# Patient Record
Sex: FEMALE | Race: WHITE
[De-identification: names, ages, dates, MRNs, and addresses within clinical notes are randomized per-mention and may not be internally consistent; named-entity substitution may affect disease eponyms.]

---

## 2017-08-30 ENCOUNTER — HOSPITAL ENCOUNTER (EMERGENCY)
Dept: HOSPITAL 5 - ED | Age: 32
Discharge: HOME | End: 2017-08-30
Payer: MEDICAID

## 2017-08-30 VITALS — DIASTOLIC BLOOD PRESSURE: 73 MMHG | SYSTOLIC BLOOD PRESSURE: 118 MMHG

## 2017-08-30 DIAGNOSIS — N30.01: Primary | ICD-10-CM

## 2017-08-30 DIAGNOSIS — F17.200: ICD-10-CM

## 2017-08-30 LAB
ALBUMIN SERPL-MCNC: 3.2 G/DL (ref 3.9–5)
ALBUMIN/GLOB SERPL: 1 %
ALP SERPL-CCNC: 49 UNITS/L (ref 35–129)
ALT SERPL-CCNC: 8 UNITS/L (ref 7–56)
ANION GAP SERPL CALC-SCNC: 16 MMOL/L
BACTERIA #/AREA URNS HPF: (no result) /HPF
BASOPHILS NFR BLD AUTO: 0.4 % (ref 0–1.8)
BILIRUB SERPL-MCNC: 0.2 MG/DL (ref 0.1–1.2)
BILIRUB UR QL STRIP: (no result)
BLOOD UR QL VISUAL: (no result)
BUN SERPL-MCNC: 12 MG/DL (ref 7–17)
BUN/CREAT SERPL: 24 %
CALCIUM SERPL-MCNC: 8.2 MG/DL (ref 8.4–10.2)
CHLORIDE SERPL-SCNC: 106.5 MMOL/L (ref 98–107)
CO2 SERPL-SCNC: 20 MMOL/L (ref 22–30)
EOSINOPHIL NFR BLD AUTO: 1.8 % (ref 0–4.3)
GLUCOSE SERPL-MCNC: 84 MG/DL (ref 65–100)
HCT VFR BLD CALC: 45.8 % (ref 30.3–42.9)
HGB BLD-MCNC: 14.5 GM/DL (ref 10.1–14.3)
KETONES UR STRIP-MCNC: (no result) MG/DL
LEUKOCYTE ESTERASE UR QL STRIP: (no result)
LIPASE SERPL-CCNC: 39 UNITS/L (ref 13–60)
MCH RBC QN AUTO: 28 PG (ref 28–32)
MCHC RBC AUTO-ENTMCNC: 32 % (ref 30–34)
MCV RBC AUTO: 89 FL (ref 79–97)
MUCOUS THREADS #/AREA URNS HPF: (no result) /HPF
NITRITE UR QL STRIP: (no result)
PH UR STRIP: 5 [PH] (ref 5–7)
PLATELET # BLD: 193 K/MM3 (ref 140–440)
POTASSIUM SERPL-SCNC: 4.9 MMOL/L (ref 3.6–5)
PROT SERPL-MCNC: 6.3 G/DL (ref 6.3–8.2)
RBC # BLD AUTO: 5.15 M/MM3 (ref 3.65–5.03)
RBC #/AREA URNS HPF: 22 /HPF (ref 0–6)
SODIUM SERPL-SCNC: 138 MMOL/L (ref 137–145)
UROBILINOGEN UR-MCNC: < 2 MG/DL (ref ?–2)
WBC # BLD AUTO: 8.3 K/MM3 (ref 4.5–11)
WBC #/AREA URNS HPF: 131 /HPF (ref 0–6)

## 2017-08-30 PROCEDURE — 80053 COMPREHEN METABOLIC PANEL: CPT

## 2017-08-30 PROCEDURE — 85025 COMPLETE CBC W/AUTO DIFF WBC: CPT

## 2017-08-30 PROCEDURE — 96372 THER/PROPH/DIAG INJ SC/IM: CPT

## 2017-08-30 PROCEDURE — 87086 URINE CULTURE/COLONY COUNT: CPT

## 2017-08-30 PROCEDURE — 83690 ASSAY OF LIPASE: CPT

## 2017-08-30 PROCEDURE — 84703 CHORIONIC GONADOTROPIN ASSAY: CPT

## 2017-08-30 PROCEDURE — 81001 URINALYSIS AUTO W/SCOPE: CPT

## 2017-08-30 PROCEDURE — 36415 COLL VENOUS BLD VENIPUNCTURE: CPT

## 2017-08-30 PROCEDURE — 99283 EMERGENCY DEPT VISIT LOW MDM: CPT

## 2017-08-30 NOTE — EMERGENCY DEPARTMENT REPORT
Entered by AURELIA PALOMO, acting as scribe for JEREMY CLIFFORD PA.





ED Female  HPI





- General


Chief complaint: Urogenital-Female


Stated complaint: POSS BLADDER INFECTION


Time Seen by Provider: 17 10:50


Source: patient


Mode of arrival: Ambulatory


Limitations: No Limitations





- History of Present Illness


Initial comments: 





30 y/o female with a PMHx of asthma and obesity presents to the ED c/o pelvic 

pain that began 1 day ago. pain on/oo.Rates pain a 6/10 in severity, which she 

describes as cramping in quality. Aggravated with urination and alleviated with 

nothing. No pain at present. Associated symptom includes dysuria, but she 

denies vaginal discharge, vaginal bleeding, hematuria, urinary urgency and 

frequency, fever, chills, nausea, vomiting, and diarrhea. Denies taking 

anything for her symptoms. LMP 08/10/2017. NKDA. No medication taken. Not 

concern for STD.





MD Complaint: dysuria, pelvic pain


Onset/Timin


-: days(s)


Location: other (pelvic area)


Radiation: non-radiating


Severity: moderate


Severity scale (0 -10): 6


Quality: cramping


Consistency: constant


Improves with: none


Worsens with: urination


Are you Pregnant Now?: No


Last Menstrual Period: 08/10/17


EDC: 18


Associated Symptoms: denies other symptoms, abdominal pain (pelvic pain), 

dysuria.  denies: vaginal discharge, vaginal bleeding, nausea/vomiting, fever/

chills, headaches, loss of appetite, hematuria, rash, seizure, shortness of 

breath, syncope, weakness





- Related Data


Sexually active: Yes (1 partner for years)


 Previous Rx's











 Medication  Instructions  Recorded  Last Taken  Type


 


HYDROcodone/APAP 5-325 [[place] 1 each PO Q6HR PRN #16 tablet 06/15/15 Unknown Rx





5/325]    


 


Sulfamethoxazole/Trimethoprim 1 each PO Q12H #20 tablet 06/15/15 Unknown Rx





[Bactrim Ds]    


 


Nitrofurantoin Mono/M-Cryst 100 mg PO Q12HR #14 capsule 17 Unknown Rx





[Macrobid CAP]    


 


Phenazopyridine [Pyridium] 100 mg PO TID PRN #9 tab 17 Unknown Rx











 Allergies











Allergy/AdvReac Type Severity Reaction Status Date / Time


 


No Known Allergies Allergy   Unverified 14 09:12














ED Review of Systems


Comment: All other systems reviewed and negative


Constitutional: denies: chills, fever


Eyes: denies: eye pain, eye discharge, vision change


ENT: denies: ear pain, throat pain


Respiratory: denies: cough, orthopnea, shortness of breath, SOB with exertion, 

SOB at rest, stridor, wheezing


Cardiovascular: denies: chest pain, palpitations, dyspnea on exertion, orthopnea

, edema, syncope, paroxysmal nocturnal dyspnea


Endocrine: no symptoms reported


Gastrointestinal: abdominal pain (pelvic pain).  denies: nausea, vomiting, 

diarrhea, constipation, hematemesis, melena, hematochezia


Genitourinary: dysuria.  denies: urgency, frequency, hematuria, discharge, 

abnormal menses, dyspareunia


Musculoskeletal: denies: back pain, joint swelling, arthralgia


Skin: denies: rash, lesions


Neurological: denies: headache, weakness, numbness, paresthesias, confusion, 

abnormal gait, vertigo


Psychiatric: denies: anxiety, depression


Hematological/Lymphatic: denies: easy bleeding, easy bruising





ED Past Medical Hx





- Past Medical History


Previous Medical History?: Yes


Hx Hypertension: No


Hx Congestive Heart Failure: No


Hx Diabetes: No


Hx Deep Vein Thrombosis: No


Hx Renal Disease: No


Hx Sickle Cell Disease: No


Hx Seizures: No


Hx Asthma: Yes (CHILD)


Hx COPD: No


Hx HIV: No


Additional medical history: Obesity





- Surgical History


Past Surgical History?: No





- Family History


Family history: no significant





- Social History


Smoking Status: Current Every Day Smoker


Substance Use Type: Alcohol





- Medications


Home Medications: 


 Home Medications











 Medication  Instructions  Recorded  Confirmed  Last Taken  Type


 


HYDROcodone/APAP 5-325 [[place] 1 each PO Q6HR PRN #16 tablet 06/15/15  Unknown Rx





5/325]     


 


Sulfamethoxazole/Trimethoprim 1 each PO Q12H #20 tablet 06/15/15  Unknown Rx





[Bactrim Ds]     


 


Nitrofurantoin Mono/M-Cryst 100 mg PO Q12HR #14 capsule 17  Unknown Rx





[Macrobid CAP]     


 


Phenazopyridine [Pyridium] 100 mg PO TID PRN #9 tab 17  Unknown Rx














ED Physical Exam





- General


Limitations: No Limitations


General appearance: alert, in no apparent distress





- Head


Head exam: Present: atraumatic, normocephalic, normal inspection





- Eye


Eye exam: Present: normal appearance, PERRL, EOMI


Pupils: Present: normal accommodation





- ENT


ENT exam: Present: normal exam, normal orophraynx, mucous membranes moist, TM's 

normal bilaterally, normal external ear exam





- Neck


Neck exam: Present: normal inspection, full ROM.  Absent: tenderness, 

meningismus, lymphadenopathy





- Respiratory


Respiratory exam: Present: normal lung sounds bilaterally.  Absent: respiratory 

distress, wheezes, rales, rhonchi, stridor, chest wall tenderness, accessory 

muscle use, decreased breath sounds





- Cardiovascular


Cardiovascular Exam: Present: regular rate, normal rhythm, normal heart sounds.

  Absent: systolic murmur, diastolic murmur, S3, S4





- GI/Abdominal


GI/Abdominal exam: Present: soft, normal bowel sounds.  Absent: distended, 

tenderness, guarding, rebound, rigid, mass, bruit, pulsatile mass, hernia





- Extremities Exam


Extremities exam: Present: normal inspection, full ROM, normal capillary 

refill.  Absent: tenderness, pedal edema, joint swelling, calf tenderness





- Back Exam


Back exam: Present: normal inspection, full ROM.  Absent: tenderness, CVA 

tenderness (R), CVA tenderness (L), muscle spasm, paraspinal tenderness, 

vertebral tenderness, rash noted





- Neurological Exam


Neurological exam: Present: alert, oriented X3, normal gait, reflexes normal.  

Absent: motor sensory deficit





- Psychiatric


Psychiatric exam: Present: normal affect, normal mood





- Skin


Skin exam: Present: warm, dry, intact, normal color.  Absent: rash





ED Course


 Vital Signs











  17





  08:10


 


Temperature 98.4 F


 


Pulse Rate 92 H


 


Respiratory 20





Rate 


 


Blood Pressure 118/73


 


O2 Sat by Pulse 100





Oximetry 














- Reevaluation(s)


Reevaluation #1: 





17 13:39


 Rocephin 1 g in emergency room to cover urinary tract infection.  Urine 

culture sent





ED Medical Decision Making





- Lab Data


Result diagrams: 


 17 08:28





 17 08:28








 Lab Results











  17 Range/Units





  08:28 08:28 08:28 


 


WBC   8.3   (4.5-11.0)  K/mm3


 


RBC   5.15 H   (3.65-5.03)  M/mm3


 


Hgb   14.5 H   (10.1-14.3)  gm/dl


 


Hct   45.8 H   (30.3-42.9)  %


 


MCV   89   (79-97)  fl


 


MCH   28   (28-32)  pg


 


MCHC   32   (30-34)  %


 


RDW   16.1 H   (13.2-15.2)  %


 


Plt Count   193   (140-440)  K/mm3


 


Lymph % (Auto)   31.8   (13.4-35.0)  %


 


Mono % (Auto)   7.9 H   (0.0-7.3)  %


 


Eos % (Auto)   1.8   (0.0-4.3)  %


 


Baso % (Auto)   0.4   (0.0-1.8)  %


 


Lymph #   2.7   (1.2-5.4)  K/mm3


 


Mono #   0.7   (0.0-0.8)  K/mm3


 


Eos #   0.2   (0.0-0.4)  K/mm3


 


Baso #   0.0   (0.0-0.1)  K/mm3


 


Seg Neutrophils %   58.1   (40.0-70.0)  %


 


Seg Neutrophils #   4.9   (1.8-7.7)  K/mm3


 


Sodium    138  (137-145)  mmol/L


 


Potassium    4.9  (3.6-5.0)  mmol/L


 


Chloride    106.5  ()  mmol/L


 


Carbon Dioxide    20 L  (22-30)  mmol/L


 


Anion Gap    16  mmol/L


 


BUN    12  (7-17)  mg/dL


 


Creatinine    0.5 L  (0.7-1.2)  mg/dL


 


Estimated GFR    > 60  ml/min


 


BUN/Creatinine Ratio    24.00  %


 


Glucose    84  ()  mg/dL


 


Calcium    8.2 L  (8.4-10.2)  mg/dL


 


Total Bilirubin    0.20  (0.1-1.2)  mg/dL


 


AST    17  (5-40)  units/L


 


ALT    8  (7-56)  units/L


 


Alkaline Phosphatase    49  ()  units/L


 


Total Protein    6.3  (6.3-8.2)  g/dL


 


Albumin    3.2 L  (3.9-5)  g/dL


 


Albumin/Globulin Ratio    1.0  %


 


Lipase    39  (13-60)  units/L


 


HCG, Qual     (Negative)  


 


Urine Color  Yellow    (Yellow)  


 


Urine Turbidity  Slightly-cloudy    (Clear)  


 


Urine pH  5.0    (5.0-7.0)  


 


Ur Specific Gravity  1.021    (1.003-1.030)  


 


Urine Protein  100 mg/dl    (Negative)  mg/dL


 


Urine Glucose (UA)  Neg    (Negative)  mg/dL


 


Urine Ketones  Neg    (Negative)  mg/dL


 


Urine Blood  Mod    (Negative)  


 


Urine Nitrite  Neg    (Negative)  


 


Urine Bilirubin  Neg    (Negative)  


 


Urine Urobilinogen  < 2.0    (<2.0)  mg/dL


 


Ur Leukocyte Esterase  Mod    (Negative)  


 


Urine WBC (Auto)  131.0 H    (0.0-6.0)  /HPF


 


Urine RBC (Auto)  22.0    (0.0-6.0)  /HPF


 


U Epithel Cells (Auto)  5.0    (0-13.0)  /HPF


 


Urine Bacteria (Auto)  1+    (Negative)  /HPF


 


Urine Mucus  2+    /HPF














  17 Range/Units





  08:28 


 


WBC   (4.5-11.0)  K/mm3


 


RBC   (3.65-5.03)  M/mm3


 


Hgb   (10.1-14.3)  gm/dl


 


Hct   (30.3-42.9)  %


 


MCV   (79-97)  fl


 


MCH   (28-32)  pg


 


MCHC   (30-34)  %


 


RDW   (13.2-15.2)  %


 


Plt Count   (140-440)  K/mm3


 


Lymph % (Auto)   (13.4-35.0)  %


 


Mono % (Auto)   (0.0-7.3)  %


 


Eos % (Auto)   (0.0-4.3)  %


 


Baso % (Auto)   (0.0-1.8)  %


 


Lymph #   (1.2-5.4)  K/mm3


 


Mono #   (0.0-0.8)  K/mm3


 


Eos #   (0.0-0.4)  K/mm3


 


Baso #   (0.0-0.1)  K/mm3


 


Seg Neutrophils %   (40.0-70.0)  %


 


Seg Neutrophils #   (1.8-7.7)  K/mm3


 


Sodium   (137-145)  mmol/L


 


Potassium   (3.6-5.0)  mmol/L


 


Chloride   ()  mmol/L


 


Carbon Dioxide   (22-30)  mmol/L


 


Anion Gap   mmol/L


 


BUN   (7-17)  mg/dL


 


Creatinine   (0.7-1.2)  mg/dL


 


Estimated GFR   ml/min


 


BUN/Creatinine Ratio   %


 


Glucose   ()  mg/dL


 


Calcium   (8.4-10.2)  mg/dL


 


Total Bilirubin   (0.1-1.2)  mg/dL


 


AST   (5-40)  units/L


 


ALT   (7-56)  units/L


 


Alkaline Phosphatase   ()  units/L


 


Total Protein   (6.3-8.2)  g/dL


 


Albumin   (3.9-5)  g/dL


 


Albumin/Globulin Ratio   %


 


Lipase   (13-60)  units/L


 


HCG, Qual  Negative  (Negative)  


 


Urine Color   (Yellow)  


 


Urine Turbidity   (Clear)  


 


Urine pH   (5.0-7.0)  


 


Ur Specific Gravity   (1.003-1.030)  


 


Urine Protein   (Negative)  mg/dL


 


Urine Glucose (UA)   (Negative)  mg/dL


 


Urine Ketones   (Negative)  mg/dL


 


Urine Blood   (Negative)  


 


Urine Nitrite   (Negative)  


 


Urine Bilirubin   (Negative)  


 


Urine Urobilinogen   (<2.0)  mg/dL


 


Ur Leukocyte Esterase   (Negative)  


 


Urine WBC (Auto)   (0.0-6.0)  /HPF


 


Urine RBC (Auto)   (0.0-6.0)  /HPF


 


U Epithel Cells (Auto)   (0-13.0)  /HPF


 


Urine Bacteria (Auto)   (Negative)  /HPF


 


Urine Mucus   /HPF








Urine culture sent





- Medical Decision Making





ED course: An hair complaining of burning with urination abdominal cramping on 

and off.  Denies any concern for STD, vaginal discharge, blood in urine.  

Patient found to have acute cystitis with hematuria.  She was given Rocephin 1 

g IM in emergency room for UTI.  Urinalysis positive for bacteria and white 

blood cells leukocyte Estrace and blood.  Urine culture sent and pending.  CBC 

stable with normal white count, BMP mildly abnormal values but no critical 

values.  HCG negative  Diagnosis and treatment plan discussed the patient and 

she voiced understanding.








Diagnostic/labs: See lab section for details on results.





Assessment/plan


1.  Acute cystitis with hematuria


2.  Pelvic pain, prosodic started yesterday


3.  Dysuria





She discharged home from emergency room she is able to tolerate oral liquids.  

She was given prescription for Macrobid and Pyridium and to follow-up with her 

primary care physician in 2 days





ED Disposition


Clinical Impression: 


 Dysuria, Acute cystitis with hematuria, Pelvic pain





Disposition:  TO HOME OR SELFCARE


Is pt being admited?: No


Does the pt Need Aspirin: No


Condition: Stable


Instructions:  Urinary Tract Infection in Women (ED), Dysuria (ED)


Additional Instructions: 


Increase her fluid intake to 2-3 L of fluid to include mostly water per day


Take antibiotic for urinary tract infection


Please Up primary care doctor in 2 days


Prescriptions: 


Nitrofurantoin Mono/M-Cryst [Macrobid CAP] 100 mg PO Q12HR #14 capsule


Phenazopyridine [Pyridium] 100 mg PO TID PRN #9 tab


 PRN Reason: URINE BURNING


Referrals: 


PRIMARY CARE,MD [Primary Care Provider] - 17


[facility] [Outside] - 3-5 Days


Forms:  Work/School Release Form(ED)





This documentation as recorded by the DEWEY smith JASMINE,accurately 

reflects the service I personally performed and the decisions made by me,JEREMY CLIFFORD PA.

## 2018-05-08 ENCOUNTER — HOSPITAL ENCOUNTER (EMERGENCY)
Dept: HOSPITAL 5 - ED | Age: 33
Discharge: HOME | End: 2018-05-08
Payer: MEDICAID

## 2018-05-08 VITALS — DIASTOLIC BLOOD PRESSURE: 66 MMHG | SYSTOLIC BLOOD PRESSURE: 110 MMHG
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
## 2018-05-08 DIAGNOSIS — S39.012A: Primary | ICD-10-CM

## 2018-05-08 DIAGNOSIS — S13.4XXA: ICD-10-CM
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
## 2018-05-08 PROCEDURE — 99282 EMERGENCY DEPT VISIT SF MDM: CPT
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
## 2022-08-23 ENCOUNTER — HOSPITAL ENCOUNTER (OUTPATIENT)
Dept: HOSPITAL 5 - LABHHL | Age: 37
Discharge: HOME | End: 2022-08-23
Attending: SURGERY
Payer: COMMERCIAL

## 2022-08-23 DIAGNOSIS — N63.25: Primary | ICD-10-CM

## 2022-08-23 PROCEDURE — 88305 TISSUE EXAM BY PATHOLOGIST: CPT

## 2022-09-15 ENCOUNTER — HOSPITAL ENCOUNTER (OUTPATIENT)
Dept: HOSPITAL 5 - MAMMO | Age: 37
Discharge: HOME | End: 2022-09-15
Attending: SURGERY
Payer: COMMERCIAL

## 2022-09-15 DIAGNOSIS — N60.01: ICD-10-CM

## 2022-09-15 DIAGNOSIS — D24.1: Primary | ICD-10-CM

## 2022-09-15 PROCEDURE — 77066 DX MAMMO INCL CAD BI: CPT

## 2022-09-15 NOTE — ULTRASOUND REPORT
BILATERAL DIGITAL DIAGNOSTIC MAMMOGRAM CONVENTIONAL, 9/15/2022

RIGHT LIMITED BREAST ULTRASOUND

 

CLINICAL INFORMATION / INDICATION: Patient presents for evaluation of an area of palpable concern in 
the right breast. Patient has a history of prior breast reduction. 



TECHNIQUE: Digital bilateral mammographic imaging was performed. Spot compression views were obtained
. Limited ultrasound was performed. 



COMPARISON: Baseline



FINDINGS: 



Breast Density: The breasts are almost entirely fatty.



MAMMOGRAPHIC FINDINGS: No dominant mass, suspicious calcifications, or architectural distortion in ei
ther breast. There is benign postreduction change seen in both breasts. Corresponding with the site o
f palpable concern in the posterior 6:00 position of the right breast, there is a 9 mm benign oil cys
t. Targeted ultrasound was performed for confirmation.



ULTRASOUND FINDINGS: Targeted ultrasound evaluation was performed of the area of interest.   Targeted
 ultrasound of the area of palpable concern in the right breast 7:00 position demonstrates a 9 mm sup
erficial benign cyst, compatible with the benign oil cyst as seen on mammogram. No suspicious sonogra
phic abnormality identified.





IMPRESSION: 

1. A benign oil cyst accounts for the area of palpable concern in the right breast. No suspicious ronnie
mographic or sonographic abnormality identified.



Follow up recommendation: Unless otherwise clinically indicated, recommend patient return to routine 
yearly screening mammography at age 42.



BI-RADS Category 2:  BENIGN.





-------------------------------------------------------------------------------------------

A "normal" or negative report should not discourage follow up or biopsy of a clinically significant f
inding.



A written summary of these findings will be mailed to the patient. The patient will be entered into a
 mammography reporting system which will generate a reminder letter for the patient's next appointmen
t at the appropriate interval.



According to the [employer], yearly mammograms are recommended starting at age 40 
and continuing as long as a woman is in good health.  Breast MRI is recommended for women with an dianna
roximately 20-25% or greater lifetime risk of breast cancer, including women with a strong family his
tory of breast or ovarian cancer and women who have been treated for Hodgkin's disease.



Signer Name: Ashley Oakley MD 

Signed: 9/15/2022 12:25 PM

Workstation Name: [email or web address]

## 2022-09-15 NOTE — MAMMOGRAPHY REPORT
BILATERAL DIGITAL DIAGNOSTIC MAMMOGRAM CONVENTIONAL, 9/15/2022

RIGHT LIMITED BREAST ULTRASOUND

 

CLINICAL INFORMATION / INDICATION: Patient presents for evaluation of an area of palpable concern in 
the right breast. Patient has a history of prior breast reduction. 



TECHNIQUE: Digital bilateral mammographic imaging was performed. Spot compression views were obtained
. Limited ultrasound was performed. 



COMPARISON: Baseline



FINDINGS: 



Breast Density: The breasts are almost entirely fatty.



MAMMOGRAPHIC FINDINGS: No dominant mass, suspicious calcifications, or architectural distortion in ei
ther breast. There is benign postreduction change seen in both breasts. Corresponding with the site o
f palpable concern in the posterior 6:00 position of the right breast, there is a 9 mm benign oil cys
t. Targeted ultrasound was performed for confirmation.



ULTRASOUND FINDINGS: Targeted ultrasound evaluation was performed of the area of interest.   Targeted
 ultrasound of the area of palpable concern in the right breast 7:00 position demonstrates a 9 mm sup
erficial benign cyst, compatible with the benign oil cyst as seen on mammogram. No suspicious sonogra
phic abnormality identified.





IMPRESSION: 

1. A benign oil cyst accounts for the area of palpable concern in the right breast. No suspicious ronnie
mographic or sonographic abnormality identified.



Follow up recommendation: Unless otherwise clinically indicated, recommend patient return to routine 
yearly screening mammography at age 42.



BI-RADS Category 2:  BENIGN.





-------------------------------------------------------------------------------------------

A "normal" or negative report should not discourage follow up or biopsy of a clinically significant f
inding.



A written summary of these findings will be mailed to the patient. The patient will be entered into a
 mammography reporting system which will generate a reminder letter for the patient's next appointmen
t at the appropriate interval.



According to the [employer], yearly mammograms are recommended starting at age 40 
and continuing as long as a woman is in good health.  Breast MRI is recommended for women with an dianna
roximately 20-25% or greater lifetime risk of breast cancer, including women with a strong family his
tory of breast or ovarian cancer and women who have been treated for Hodgkin's disease.



Signer Name: Ashley Oakley MD 

Signed: 9/15/2022 12:25 PM

Workstation Name: [email or web address]